# Patient Record
Sex: FEMALE | Race: WHITE | NOT HISPANIC OR LATINO | Employment: FULL TIME | URBAN - METROPOLITAN AREA
[De-identification: names, ages, dates, MRNs, and addresses within clinical notes are randomized per-mention and may not be internally consistent; named-entity substitution may affect disease eponyms.]

---

## 2021-04-20 ENCOUNTER — TELEPHONE (OUTPATIENT)
Dept: OBGYN CLINIC | Facility: CLINIC | Age: 61
End: 2021-04-20

## 2021-04-20 DIAGNOSIS — M81.0 AGE-RELATED OSTEOPOROSIS WITHOUT CURRENT PATHOLOGICAL FRACTURE: Primary | ICD-10-CM

## 2021-04-20 NOTE — TELEPHONE ENCOUNTER
L/M on patient v/m to c/b, leave good time to reach her    Previously left message on 4/14/2021 to c/b reference reclast benefit

## 2021-04-27 NOTE — TELEPHONE ENCOUNTER
Dr Lewis Apo, multiple messages have been left with no return call  [facility] scheduled for 6/18/2021

## 2021-04-30 NOTE — TELEPHONE ENCOUNTER
Garrybraden Gilford called back, she would like to schedule for Reclast  Reviewed spoke with Charlie PATINO at [facility] drug copay $250 00 and may need to pay $40 copay for visit  Ref# [id number]  She would like to proceed with infusion  She had not called back previously due to many things going on at home  Explained may need to repeat blood test to confirm kidney function but will check with Dr Julio Guerra  Patient verbalized understanding  Dr Julio Guerra- labs dated 3/19/2021  Do you want creatinine repeated?

## 2021-05-14 NOTE — TELEPHONE ENCOUNTER
Pt left a message she is waiting to schedule her next Reclast  Left a message for pt informing  The nurse who schedules Reclast is out of the office at this time however will have her contact pt upon return     Forwarded to [facility] [place]

## 2021-05-17 NOTE — TELEPHONE ENCOUNTER
L/M on Shaista Pole v/m bloodwork result has not been received  Will attempt to obtain from [facility], forward to dr Joshua Reyna and call her back  Unable to locate result on [facility] beacon  Attempted phone call, received v/m [facility] is having technical difficulty  Will try again later

## 2021-05-19 ENCOUNTER — TELEPHONE (OUTPATIENT)
Dept: OBGYN CLINIC | Facility: CLINIC | Age: 61
End: 2021-05-19

## 2021-05-19 NOTE — TELEPHONE ENCOUNTER
Per [facility] beacon no f/u creatinine noted  Left message for Donna Romano to c/b, messages previously left 2 weeks ago and 2 days ago  Did she have the additional creatinine level done?

## 2021-05-19 NOTE — TELEPHONE ENCOUNTER
Vikram Pisano is currently out of town  She will have repeat creatinine level drawn Monday  She will call office 2-3 days after completing bloodwork to move forward with reclast scheduling

## 2021-05-22 LAB
CREAT SERPL-MCNC: 0.78 MG/DL (ref 0.57–1)
SL AMB EGFR AFRICAN AMERICAN: 96 ML/MIN/1.73
SL AMB EGFR NON AFRICAN AMERICAN: 83 ML/MIN/1.73

## 2021-06-01 ENCOUNTER — TELEPHONE (OUTPATIENT)
Dept: OBGYN CLINIC | Facility: CLINIC | Age: 61
End: 2021-06-01

## 2021-06-01 NOTE — TELEPHONE ENCOUNTER
Dr Jose Krishnan is scheduled for next Wednesday 6/9/2021  Please enter reclast as clinic administered med   Thank you

## 2021-06-01 NOTE — TELEPHONE ENCOUNTER
Reviewed updated lab result  Reviewed most common side effects, pre and post infusion instructions  Instructions mailed to confirmed address  Infusion scheduled for 6/9/2021 at 8:30 in [facility] adding to nurse schedule

## 2021-06-02 NOTE — TELEPHONE ENCOUNTER
I placed the order, will not let me sign it yet  Let me know if I need to do something else  Thanks

## 2021-06-08 NOTE — TELEPHONE ENCOUNTER
You should be able to sign the order now  It was probably too early before  Appointment is tomorrow    Thank you

## 2021-06-16 PROBLEM — Z91.89 AT HIGH RISK FOR BREAST CANCER: Status: ACTIVE | Noted: 2021-06-16

## 2021-06-16 PROBLEM — Z80.3 FAMILY HISTORY OF MALIGNANT NEOPLASM OF BREAST: Status: ACTIVE | Noted: 2021-06-16

## 2021-06-18 ENCOUNTER — OFFICE VISIT (OUTPATIENT)
Dept: OBGYN CLINIC | Facility: CLINIC | Age: 61
End: 2021-06-18
Payer: COMMERCIAL

## 2021-06-18 VITALS
BODY MASS INDEX: 26.87 KG/M2 | WEIGHT: 128 LBS | SYSTOLIC BLOOD PRESSURE: 110 MMHG | HEIGHT: 58 IN | DIASTOLIC BLOOD PRESSURE: 70 MMHG

## 2021-06-18 DIAGNOSIS — M81.0 POST-MENOPAUSAL OSTEOPOROSIS: ICD-10-CM

## 2021-06-18 DIAGNOSIS — Z80.3 FAMILY HISTORY OF MALIGNANT NEOPLASM OF BREAST: ICD-10-CM

## 2021-06-18 DIAGNOSIS — Z91.89 AT HIGH RISK FOR BREAST CANCER: ICD-10-CM

## 2021-06-18 DIAGNOSIS — Z01.419 ENCOUNTER FOR GYNECOLOGICAL EXAMINATION (GENERAL) (ROUTINE) WITHOUT ABNORMAL FINDINGS: Primary | ICD-10-CM

## 2021-06-18 PROCEDURE — 99213 OFFICE O/P EST LOW 20 MIN: CPT | Performed by: OBSTETRICS & GYNECOLOGY

## 2021-06-18 RX ORDER — MELATONIN
1000 DAILY
COMMUNITY

## 2021-06-18 RX ORDER — CLONAZEPAM 0.5 MG/1
0.5 TABLET ORAL 2 TIMES DAILY PRN
COMMUNITY

## 2021-06-18 RX ORDER — QUETIAPINE 150 MG/1
150 TABLET, FILM COATED, EXTENDED RELEASE ORAL
COMMUNITY

## 2021-06-18 RX ORDER — BUPROPION HYDROCHLORIDE 300 MG/1
300 TABLET ORAL DAILY
COMMUNITY

## 2021-06-18 NOTE — ASSESSMENT & PLAN NOTE
No change in self breast or clinical breast exams  Mammo order given  Stopped Evista as starting Reclast in near future  Creatinine order given  RTO 6 months wellness

## 2021-06-18 NOTE — PROGRESS NOTES
Assessment/Plan:    Family history of malignant neoplasm of breast  No change in self breast or clinical breast exams  Mammo order given  Stopped Evista as starting Reclast in near future  Creatinine order given  RTO 6 months wellness  Diagnoses and all orders for this visit:    Encounter for gynecological examination (general) (routine) without abnormal findings    Family history of malignant neoplasm of breast    At high risk for breast cancer    Post-menopausal osteoporosis  -     Creatinine, serum; Future  -     Creatinine, serum    Other orders  -     cholecalciferol (VITAMIN D3) 1,000 units tablet; Take 1,000 Units by mouth daily  -     buPROPion (WELLBUTRIN XL) 300 mg 24 hr tablet; Take 300 mg by mouth daily  -     lisdexamfetamine (VYVANSE) 50 MG capsule; Take 50 mg by mouth every morning  -     QUEtiapine (SEROquel XR) 150 mg 24 hr tablet; Take 150 mg by mouth daily at bedtime  -     Ferrous Sulfate Dried (High Potency Iron) 65 MG TABS; Take by mouth  -     clonazePAM (KlonoPIN) 0 5 mg tablet; Take 0 5 mg by mouth 2 (two) times a day as needed for seizures          Subjective:      Patient ID: Keshav Elder is a 62 y o  female  Here for breast check  The following portions of the patient's history were reviewed and updated as appropriate: allergies, current medications, past family history, past medical history, past social history, past surgical history and problem list     Review of Systems  No breast masses, nipple discharge or nipple bleeding        Objective:      /70   Ht 4' 10" (1 473 m)   Wt 58 1 kg (128 lb)   BMI 26 75 kg/m²          Physical Exam  Neck: thyroid normal size without nodules, no palpable adenopathy  Breasts: no masses, nodes, skin changes  Abdomen: soft, non tender, non tender liver edge

## 2021-06-29 ENCOUNTER — TELEPHONE (OUTPATIENT)
Dept: OBGYN CLINIC | Facility: CLINIC | Age: 61
End: 2021-06-29

## 2021-06-29 LAB
CREAT SERPL-MCNC: 0.87 MG/DL (ref 0.57–1)
SL AMB EGFR AFRICAN AMERICAN: 84 ML/MIN/1.73
SL AMB EGFR NON AFRICAN AMERICAN: 73 ML/MIN/1.73

## 2021-06-29 NOTE — TELEPHONE ENCOUNTER
Scheduled Reclast for 7/21/2021 8:30am  (Added to schedule by Bear Mittal )  Instruction mailed to patient home

## 2021-06-29 NOTE — TELEPHONE ENCOUNTER
Please call to schedule first Reclast  Normal creatinine in Epic; normal calcium and vit D in [facility] from March   Thanks

## 2021-07-21 ENCOUNTER — CLINICAL SUPPORT (OUTPATIENT)
Dept: OBGYN CLINIC | Facility: CLINIC | Age: 61
End: 2021-07-21
Payer: COMMERCIAL

## 2021-07-21 VITALS
SYSTOLIC BLOOD PRESSURE: 132 MMHG | WEIGHT: 127 LBS | HEIGHT: 58 IN | BODY MASS INDEX: 26.66 KG/M2 | DIASTOLIC BLOOD PRESSURE: 72 MMHG

## 2021-07-21 DIAGNOSIS — M81.0 AGE-RELATED OSTEOPOROSIS WITHOUT CURRENT PATHOLOGICAL FRACTURE: Primary | ICD-10-CM

## 2021-07-21 PROCEDURE — 96365 THER/PROPH/DIAG IV INF INIT: CPT | Performed by: OBSTETRICS & GYNECOLOGY

## 2021-07-21 RX ORDER — ZOLEDRONIC ACID 5 MG/100ML
5 INJECTION, SOLUTION INTRAVENOUS ONCE
Status: COMPLETED | OUTPATIENT
Start: 2021-07-21 | End: 2021-07-21

## 2021-07-21 RX ORDER — IBUPROFEN 200 MG
1 CAPSULE ORAL DAILY
COMMUNITY

## 2021-07-21 RX ADMIN — ZOLEDRONIC ACID 5 MG: 5 INJECTION, SOLUTION INTRAVENOUS at 09:22

## 2021-07-21 NOTE — PROGRESS NOTES
Patient presents for #1 Reclast infusion  Premedicated with Tylenol  Tolerated infusion well  Reviewed post infusion instruction for continuing increased fluids and tylenol or advil 3-4 times per day x 3 days

## 2021-10-12 ENCOUNTER — VBI (OUTPATIENT)
Dept: ADMINISTRATIVE | Facility: OTHER | Age: 61
End: 2021-10-12

## 2021-12-21 ENCOUNTER — ANNUAL EXAM (OUTPATIENT)
Dept: OBGYN CLINIC | Facility: CLINIC | Age: 61
End: 2021-12-21
Payer: COMMERCIAL

## 2021-12-21 VITALS
SYSTOLIC BLOOD PRESSURE: 124 MMHG | DIASTOLIC BLOOD PRESSURE: 70 MMHG | BODY MASS INDEX: 26 KG/M2 | WEIGHT: 129 LBS | HEIGHT: 59 IN

## 2021-12-21 DIAGNOSIS — M81.0 POST-MENOPAUSAL OSTEOPOROSIS: ICD-10-CM

## 2021-12-21 DIAGNOSIS — Z91.89 AT HIGH RISK FOR BREAST CANCER: ICD-10-CM

## 2021-12-21 DIAGNOSIS — Z01.419 ENCOUNTER FOR GYNECOLOGICAL EXAMINATION (GENERAL) (ROUTINE) WITHOUT ABNORMAL FINDINGS: Primary | ICD-10-CM

## 2021-12-21 DIAGNOSIS — Z12.11 COLON CANCER SCREENING: ICD-10-CM

## 2021-12-21 DIAGNOSIS — Z12.31 ENCOUNTER FOR SCREENING MAMMOGRAM FOR BREAST CANCER: ICD-10-CM

## 2021-12-21 DIAGNOSIS — N83.292 OTHER OVARIAN CYST, LEFT SIDE: ICD-10-CM

## 2021-12-21 DIAGNOSIS — Z80.3 FAMILY HISTORY OF MALIGNANT NEOPLASM OF BREAST: ICD-10-CM

## 2021-12-21 PROCEDURE — S0612 ANNUAL GYNECOLOGICAL EXAMINA: HCPCS | Performed by: OBSTETRICS & GYNECOLOGY

## 2022-01-10 ENCOUNTER — TELEPHONE (OUTPATIENT)
Dept: OBGYN CLINIC | Facility: CLINIC | Age: 62
End: 2022-01-10

## 2022-01-10 NOTE — TELEPHONE ENCOUNTER
Please let pt know evonne will be contacting for new sample submit - problem with processing the first   thanks

## 2022-01-12 NOTE — TELEPHONE ENCOUNTER
Left a message for pt informing there was an issue with her cologard processing and Bob will be contacting her to submit a new sample  If pt has any add't questions to please call back

## 2022-03-09 LAB — COLOGUARD RESULT REPORTABLE: NEGATIVE

## 2022-03-14 ENCOUNTER — TELEPHONE (OUTPATIENT)
Dept: OBGYN CLINIC | Facility: CLINIC | Age: 62
End: 2022-03-14

## 2022-03-17 ENCOUNTER — HOSPITAL ENCOUNTER (OUTPATIENT)
Dept: RADIOLOGY | Facility: HOSPITAL | Age: 62
Discharge: HOME | End: 2022-03-17
Attending: RADIOLOGY

## 2022-03-17 DIAGNOSIS — Z76.89 REFERRAL OF PATIENT WITHOUT EXAMINATION OR TREATMENT: ICD-10-CM

## 2022-12-22 ENCOUNTER — HOSPITAL ENCOUNTER (OUTPATIENT)
Dept: ULTRASOUND IMAGING | Facility: HOSPITAL | Age: 62
End: 2022-12-22
Attending: OBSTETRICS & GYNECOLOGY

## 2022-12-22 DIAGNOSIS — N83.292 OTHER OVARIAN CYST, LEFT SIDE: ICD-10-CM

## 2022-12-27 ENCOUNTER — TELEPHONE (OUTPATIENT)
Dept: OBGYN CLINIC | Facility: CLINIC | Age: 62
End: 2022-12-27

## 2024-02-21 PROBLEM — Z01.419 ENCOUNTER FOR GYNECOLOGICAL EXAMINATION (GENERAL) (ROUTINE) WITHOUT ABNORMAL FINDINGS: Status: RESOLVED | Noted: 2021-12-21 | Resolved: 2024-02-21

## 2024-12-30 NOTE — PROGRESS NOTES
Assessment/Plan:    Encounter for gynecological examination (general) (routine) without abnormal findings  Here for well check, no breast or gyn complaints.   Has been suffering with her depression. Last seen 12/2021    Normal breast and pelvic exams.  Last pap 11/2018 neg/HPV neg; repeated today  Mammo order given, last 5/24/23 BIRADS 1C, reviewed on [facility]  Cologuard 3/2022 neg; orders given  Dexa 2021 osteoporosis, one Reclast 7/2021. Orders given, will consider additional therapy after next dexa resulted.    At high risk for breast cancer - Ale Ortiz 21% lifetime risk  Had been on Evista in past, not currently.   No contraindications or SE in past.   Rx sent.   RTO 6 months for breast exam, consideration of additional imaging and discussion of genetics again.    Post-menopausal osteoporosis  Dexa 2021, one Reclast 7/2021.   Dexa orders given       Diagnoses and all orders for this visit:    Encounter for gynecological examination (general) (routine) without abnormal findings    Encounter for screening mammogram for malignant neoplasm of breast  -     Mammo screening bilateral w 3d and cad; Future    Screening for malignant neoplasm of the cervix  -     Liquid-based pap, screening    Colon cancer screening  -     Cologuard    Post-menopausal osteoporosis  -     DXA bone density spine hip and pelvis; Future    At high risk for breast cancer  -     raloxifene (EVISTA) 60 mg tablet; Take 1 tablet (60 mg total) by mouth daily    Family history of malignant neoplasm of breast  -     raloxifene (EVISTA) 60 mg tablet; Take 1 tablet (60 mg total) by mouth daily    Other orders  -     AMLODIPINE-ATORVASTATIN PO; 5 mg in the morning  -     multivitamin (THERAGRAN) TABS; Take 1 tablet by mouth daily          Subjective:      Patient ID: Yvonne Obando is a 64 y.o. female.    HPI Here for well check.    The following portions of the patient's history were reviewed and updated as appropriate: She  has a past medical  history of At high risk for breast cancer, COVID-19 (2020), Depression, Diffuse cystic mastopathy of unspecified breast, Osteoporosis, Papanicolaou smear (2018), and Wrist fracture ().  She   Patient Active Problem List    Diagnosis Date Noted    Post-menopausal osteoporosis 2021    Family history of malignant neoplasm of breast 2021    At high risk for breast cancer - Ale Ortiz 21% lifetime risk 2021     She  has a past surgical history that includes Other surgical history (); LAPAROSCOPY ();  section; DXA procedure(historical) (2021); Mammo (historical) (2021); and Colonoscopy ().  Her family history includes Breast cancer in her maternal grandmother and paternal grandmother; Colon cancer in an other family member; Pancreatic cancer in her father; Skin cancer in her mother.  She  reports that she has never smoked. She has never used smokeless tobacco. She reports that she does not drink alcohol and does not use drugs.  Current Outpatient Medications   Medication Sig Dispense Refill    AMLODIPINE-ATORVASTATIN PO 5 mg in the morning      buPROPion (WELLBUTRIN XL) 300 mg 24 hr tablet Take 300 mg by mouth daily      cholecalciferol (VITAMIN D3) 1,000 units tablet Take 1,000 Units by mouth daily      lisdexamfetamine (VYVANSE) 50 MG capsule Take 50 mg by mouth every morning      multivitamin (THERAGRAN) TABS Take 1 tablet by mouth daily      QUEtiapine (SEROquel XR) 150 mg 24 hr tablet Take 150 mg by mouth daily at bedtime      raloxifene (EVISTA) 60 mg tablet Take 1 tablet (60 mg total) by mouth daily 30 tablet 11     No current facility-administered medications for this visit.     She is allergic to rizatriptan, codeine, and penicillin v..    Review of Systems  No PMB, breast, bladder, bowel changes. No new persistent pain, bloating, early satiety or pelvic pressure    Objective:    /62 (BP Location: Left arm, Patient Position: Sitting, Cuff Size:  "Standard)   Ht 4' 10.75\" (1.492 m)   Wt 55.8 kg (123 lb)   BMI 25.05 kg/m²      Physical Exam    General appearance: no distress, pleasant  Neck: thyroid without nodules or thyromegaly, no palpable adenopathy  Lymph nodes: no palpable adenopathy  Breasts: no masses, nodes or skin changes  Abdomen: soft, non tender, no palpable masses  Pelvic exam: normal atrophic external genitalia, urethral meatus normal, vagina atrophic without lesions, cervix atrophic without lesions, uterus small, non tender, no adnexal masses, non tender  Rectal exam: normal sphincter tone, no masses, RV confirms above    "

## 2025-01-03 ENCOUNTER — ANNUAL EXAM (OUTPATIENT)
Dept: OBGYN CLINIC | Facility: CLINIC | Age: 65
End: 2025-01-03

## 2025-01-03 VITALS
BODY MASS INDEX: 24.8 KG/M2 | WEIGHT: 123 LBS | SYSTOLIC BLOOD PRESSURE: 118 MMHG | HEIGHT: 59 IN | DIASTOLIC BLOOD PRESSURE: 62 MMHG

## 2025-01-03 DIAGNOSIS — M81.0 POST-MENOPAUSAL OSTEOPOROSIS: ICD-10-CM

## 2025-01-03 DIAGNOSIS — Z01.419 ENCOUNTER FOR GYNECOLOGICAL EXAMINATION (GENERAL) (ROUTINE) WITHOUT ABNORMAL FINDINGS: Primary | ICD-10-CM

## 2025-01-03 DIAGNOSIS — Z80.3 FAMILY HISTORY OF MALIGNANT NEOPLASM OF BREAST: ICD-10-CM

## 2025-01-03 DIAGNOSIS — Z12.11 COLON CANCER SCREENING: ICD-10-CM

## 2025-01-03 DIAGNOSIS — Z12.31 ENCOUNTER FOR SCREENING MAMMOGRAM FOR MALIGNANT NEOPLASM OF BREAST: ICD-10-CM

## 2025-01-03 DIAGNOSIS — Z91.89 AT HIGH RISK FOR BREAST CANCER: ICD-10-CM

## 2025-01-03 DIAGNOSIS — Z12.4 SCREENING FOR MALIGNANT NEOPLASM OF THE CERVIX: ICD-10-CM

## 2025-01-03 PROCEDURE — G0476 HPV COMBO ASSAY CA SCREEN: HCPCS | Performed by: OBSTETRICS & GYNECOLOGY

## 2025-01-03 PROCEDURE — 99396 PREV VISIT EST AGE 40-64: CPT | Performed by: OBSTETRICS & GYNECOLOGY

## 2025-01-03 PROCEDURE — G0145 SCR C/V CYTO,THINLAYER,RESCR: HCPCS | Performed by: OBSTETRICS & GYNECOLOGY

## 2025-01-03 RX ORDER — RALOXIFENE HYDROCHLORIDE 60 MG/1
60 TABLET, FILM COATED ORAL DAILY
Qty: 30 TABLET | Refills: 11 | Status: SHIPPED | OUTPATIENT
Start: 2025-01-03

## 2025-01-03 RX ORDER — DIPHENOXYLATE HYDROCHLORIDE AND ATROPINE SULFATE 2.5; .025 MG/1; MG/1
1 TABLET ORAL DAILY
COMMUNITY

## 2025-01-03 NOTE — LETTER
January 3, 2025     Celine Solomon, DO  [address]  [address]  [place] [address]    Patient: Yvonne Obando   YOB: 1960   Date of Visit: 1/3/2025       Dear Dr. Solomon:    Thank you for referring Yvonne Obando to me for evaluation. Below are my notes for this consultation.    If you have questions, please do not hesitate to call me. I look forward to following your patient along with you.         Sincerely,        Maite Caldera MD        CC: No Recipients    Maite Caldera MD  1/3/2025 10:01 AM  Sign when Signing Visit  Assessment/Plan:    Encounter for gynecological examination (general) (routine) without abnormal findings  Here for well check, no breast or gyn complaints.   Has been suffering with her depression. Last seen 12/2021    Normal breast and pelvic exams.  Last pap 11/2018 neg/HPV neg; repeated today  Mammo order given, last 5/24/23 BIRADS 1C, reviewed on [facility]  Cologuard 3/2022 neg; orders given  Dexa 2021 osteoporosis, one Reclast 7/2021. Orders given, will consider additional therapy after next dexa resulted.    At high risk for breast cancer - Ale Ortiz 21% lifetime risk  Had been on Evista in past, not currently.   No contraindications or SE in past.   Rx sent.   RTO 6 months for breast exam, consideration of additional imaging and discussion of genetics again.    Post-menopausal osteoporosis  Dexa 2021, one Reclast 7/2021.   Dexa orders given       Diagnoses and all orders for this visit:    Encounter for gynecological examination (general) (routine) without abnormal findings    Encounter for screening mammogram for malignant neoplasm of breast  -     Mammo screening bilateral w 3d and cad; Future    Screening for malignant neoplasm of the cervix  -     Liquid-based pap, screening    Colon cancer screening  -     Cologuard    Post-menopausal osteoporosis  -     DXA bone density spine hip and pelvis; Future    At high risk for breast cancer  -     raloxifene (EVISTA) 60 mg  tablet; Take 1 tablet (60 mg total) by mouth daily    Family history of malignant neoplasm of breast  -     raloxifene (EVISTA) 60 mg tablet; Take 1 tablet (60 mg total) by mouth daily    Other orders  -     AMLODIPINE-ATORVASTATIN PO; 5 mg in the morning  -     multivitamin (THERAGRAN) TABS; Take 1 tablet by mouth daily          Subjective:      Patient ID: Yvonne Obando is a 64 y.o. female.    HPI Here for well check.    The following portions of the patient's history were reviewed and updated as appropriate: She  has a past medical history of At high risk for breast cancer, COVID-19 (2020), Depression, Diffuse cystic mastopathy of unspecified breast, Osteoporosis, Papanicolaou smear (2018), and Wrist fracture ().  She   Patient Active Problem List    Diagnosis Date Noted   • Post-menopausal osteoporosis 2021   • Family history of malignant neoplasm of breast 2021   • At high risk for breast cancer - Ale Ortiz 21% lifetime risk 2021     She  has a past surgical history that includes Other surgical history (); LAPAROSCOPY ();  section; DXA procedure(historical) (2021); Mammo (historical) (2021); and Colonoscopy ().  Her family history includes Breast cancer in her maternal grandmother and paternal grandmother; Colon cancer in an other family member; Pancreatic cancer in her father; Skin cancer in her mother.  She  reports that she has never smoked. She has never used smokeless tobacco. She reports that she does not drink alcohol and does not use drugs.  Current Outpatient Medications   Medication Sig Dispense Refill   • AMLODIPINE-ATORVASTATIN PO 5 mg in the morning     • buPROPion (WELLBUTRIN XL) 300 mg 24 hr tablet Take 300 mg by mouth daily     • cholecalciferol (VITAMIN D3) 1,000 units tablet Take 1,000 Units by mouth daily     • lisdexamfetamine (VYVANSE) 50 MG capsule Take 50 mg by mouth every morning     • multivitamin (THERAGRAN) TABS Take  "1 tablet by mouth daily     • QUEtiapine (SEROquel XR) 150 mg 24 hr tablet Take 150 mg by mouth daily at bedtime     • raloxifene (EVISTA) 60 mg tablet Take 1 tablet (60 mg total) by mouth daily 30 tablet 11     No current facility-administered medications for this visit.     She is allergic to rizatriptan, codeine, and penicillin v..    Review of Systems  No PMB, breast, bladder, bowel changes. No new persistent pain, bloating, early satiety or pelvic pressure    Objective:    /62 (BP Location: Left arm, Patient Position: Sitting, Cuff Size: Standard)   Ht 4' 10.75\" (1.492 m)   Wt 55.8 kg (123 lb)   BMI 25.05 kg/m²      Physical Exam    General appearance: no distress, pleasant  Neck: thyroid without nodules or thyromegaly, no palpable adenopathy  Lymph nodes: no palpable adenopathy  Breasts: no masses, nodes or skin changes  Abdomen: soft, non tender, no palpable masses  Pelvic exam: normal atrophic external genitalia, urethral meatus normal, vagina atrophic without lesions, cervix atrophic without lesions, uterus small, non tender, no adnexal masses, non tender  Rectal exam: normal sphincter tone, no masses, RV confirms above      "

## 2025-01-03 NOTE — ASSESSMENT & PLAN NOTE
Had been on Evista in past, not currently.   No contraindications or SE in past.   Rx sent.   RTO 6 months for breast exam, consideration of additional imaging and discussion of genetics again.

## 2025-01-03 NOTE — ASSESSMENT & PLAN NOTE
Here for well check, no breast or gyn complaints.   Has been suffering with her depression. Last seen 12/2021    Normal breast and pelvic exams.  Last pap 11/2018 neg/HPV neg; repeated today  Mammo order given, last 5/24/23 BIRADS 1C, reviewed on [facility]  Cologuard 3/2022 neg; orders given  Dexa 2021 osteoporosis, one Reclast 7/2021. Orders given, will consider additional therapy after next dexa resulted.

## 2025-01-03 NOTE — PATIENT INSTRUCTIONS
Return to office in six months unless having any problems such as breast changes, bleeding, new persistent pain, new progressive bloating, new problems eating (getting full to quickly) or new constant urinary pressure that does not resolve in one week.

## 2025-01-06 LAB
HPV HR 12 DNA CVX QL NAA+PROBE: NEGATIVE
HPV16 DNA CVX QL NAA+PROBE: NEGATIVE
HPV18 DNA CVX QL NAA+PROBE: NEGATIVE

## 2025-01-09 ENCOUNTER — RESULTS FOLLOW-UP (OUTPATIENT)
Dept: OBGYN CLINIC | Facility: CLINIC | Age: 65
End: 2025-01-09

## 2025-01-09 LAB
LAB AP GYN PRIMARY INTERPRETATION: NORMAL
Lab: NORMAL

## 2025-02-07 DIAGNOSIS — Z00.6 ENCOUNTER FOR EXAMINATION FOR NORMAL COMPARISON OR CONTROL IN CLINICAL RESEARCH PROGRAM: ICD-10-CM

## 2025-03-24 ENCOUNTER — HOSPITAL ENCOUNTER (OUTPATIENT)
Dept: BONE DENSITY | Facility: CLINIC | Age: 65
Discharge: HOME | End: 2025-03-24
Payer: COMMERCIAL

## 2025-03-24 ENCOUNTER — HOSPITAL ENCOUNTER (OUTPATIENT)
Dept: MAMMOGRAPHY | Facility: CLINIC | Age: 65
Discharge: HOME | End: 2025-03-24
Payer: COMMERCIAL

## 2025-03-24 VITALS — HEIGHT: 60 IN | WEIGHT: 120 LBS | BODY MASS INDEX: 23.56 KG/M2

## 2025-03-24 DIAGNOSIS — Z12.31 ENCOUNTER FOR SCREENING MAMMOGRAM FOR MALIGNANT NEOPLASM OF BREAST: ICD-10-CM

## 2025-03-24 DIAGNOSIS — M81.0 POST-MENOPAUSAL OSTEOPOROSIS: ICD-10-CM

## 2025-03-24 LAB — COLOGUARD RESULT REPORTABLE: NEGATIVE

## 2025-03-24 PROCEDURE — 77063 BREAST TOMOSYNTHESIS BI: CPT

## 2025-03-24 PROCEDURE — 77067 SCR MAMMO BI INCL CAD: CPT

## 2025-03-24 PROCEDURE — 77080 DXA BONE DENSITY AXIAL: CPT

## 2025-04-09 NOTE — PROGRESS NOTES
Name: Yvonne Obando      : 1960      MRN: [id number]  Encounter Provider: Maite Caldera MD  Encounter Date: 4/15/2025   Encounter department: [facility]  :  Assessment & Plan  Post-menopausal osteoporosis  Discussed with patient  most recent dexa with osteoporosis of her spine and left femoral neck with increased risk of fracture.     Encouraged consideration of health impact of diagnosis balanced with risks of medications when making decisions on medications versus conservative monitoring.  Risks and benefits of bisphosphonates, Reclast reviewed. Aware of GI side effects of bisphosphonates.   Aware of increased risk of osteonecrosis of jaw, muscle and joint aches and increased risk of long bone fracture with prolonged use of bisphosphonates.  Yvonne Real did well with one dose of Reclast in  without side effects.    Reviewed appropriate calcium intake in dairy and supplement form to achieve recommended 1200 mg per day. Advised against over supplementing of calcium which may be associated with cardiovascular risk. Vitamin D supplementation recommended and dosing discussed.    Repeat dexa in 2 years for follow up.    At the end of our discussion Yvonne Real is interested in another round of Reclast.   R&B reviewed, labs ordered. Will schedule infusion when labs resulted.  Orders:    Creatinine, serum; Future    Calcium; Future    Vitamin D 25 hydroxy; Future        History of Present Illness   HPI  Yvonne Obando is a 65 y.o. female who presents to discuss dexa results.    Review of Systems  Past Medical History   Past Medical History:   Diagnosis Date    At high risk for breast cancer     Tyrer-Kuzick model with 21% lifetime risk     COVID-19 2020    Depression     Diffuse cystic mastopathy of unspecified breast     Heart valve disease     No longer a problem. Pregnancy bloodflow flattened bulge in valve    Hypertension     2024    Migraine     In my  20s     Osteoporosis     Papanicolaou smear 2018    Varicella 1999    Wrist fracture      Past Surgical History:   Procedure Laterality Date     SECTION      ,      COLONOSCOPY      Repeat     LAPAROSCOPY      OTHER SURGICAL HISTORY      arthroscopy     Family History   Problem Relation Age of Onset    Skin cancer Mother     Hyperlipidemia Mother     Hypertension Mother     Migraines Mother         Hers ended with menopause    Miscarriages / Stillbirths Mother     Osteoarthritis Mother     Osteoporosis Mother     Rashes / Skin problems Mother     Pancreatic cancer Father     Cancer Father          of pancreatic cancer    Heart disease Father         Had mitral valve replaced    Hyperlipidemia Father     Hypertension Father     Heart disease Sister         SVT    Hypertension Sister     Migraines Sister     Migraines Daughter     Migraines Daughter     Breast cancer Maternal Grandmother         Had 1 breast removed    Diabetes Maternal Grandmother         Managed well    Heart attack Maternal Grandmother     Stroke Maternal Grandmother     Heart attack Maternal Grandfather          of second heart attack at 59    Breast cancer Paternal Grandmother         Had 1 breast removed .    Diabetes Paternal Grandmother         Began in her late 80s    Transient ischemic attack Paternal Grandmother     Heart attack Paternal Grandfather          from heart attack at age 60    Colon cancer Other     Hypertension Brother     Uterine cancer Neg Hx     Ovarian cancer Neg Hx       reports that she quit smoking about 32 years ago. Her smoking use included cigarettes. She has never used smokeless tobacco. She reports that she does not currently use alcohol. She reports that she does not use drugs.  Current Outpatient Medications   Medication Instructions    amLODIPine (NORVASC) 5 mg, Daily    AMLODIPINE-ATORVASTATIN PO 5 mg, Daily    aspirin-acetaminophen-caffeine (EXCEDRIN MIGRAINE)  "250-250-65 MG per tablet Take by mouth    buPROPion (WELLBUTRIN SR) 150 mg 12 hr tablet Take 2 tablet by mouth every morning as directed    buPROPion (WELLBUTRIN XL) 300 mg, Daily    cholecalciferol (VITAMIN D3) 1,000 Units, Daily    cyclobenzaprine (FLEXERIL) 5 mg tablet 1 tablet, Every 8 hours PRN    KlonoPIN 0.5 MG tablet     lisdexamfetamine (VYVANSE) 50 mg, Every morning    multivitamin (THERAGRAN) TABS 1 tablet, Daily    Nurtec 75 MG TBDP DISSOLVE ONE TABLET (75MG) BY MOUTH DAILY AS NEEDED FOR MIGRAINE    QUEtiapine (SEROquel) 100 mg tablet     raloxifene (EVISTA) 60 mg, Oral, Daily     Allergies   Allergen Reactions    Rizatriptan Itching    Codeine Itching, Other (See Comments) and Rash     Itching    Penicillin V Rash         Objective   /60 (BP Location: Left arm, Patient Position: Sitting, Cuff Size: Standard)   Ht 4' 10.25\" (1.48 m)   Wt 56.1 kg (123 lb 9.6 oz)   BMI 25.61 kg/m²      Physical Exam  Appears well, no apparent distress.   Does not appear anxious or depressed.      DATA:  Dexa - 3/24/25 [facility] - Spine T-2.5; left hip T-2.1; left fem neck T-3.0  S/p one Reclast 7/2021     [facility] 2/2021 spine T-2.7; right hip T-2.1; right femoral neck T-2.9  "

## 2025-04-09 NOTE — ASSESSMENT & PLAN NOTE
Discussed with patient  most recent dexa with osteoporosis of her spine and left femoral neck with increased risk of fracture.     Encouraged consideration of health impact of diagnosis balanced with risks of medications when making decisions on medications versus conservative monitoring.  Risks and benefits of bisphosphonates, Reclast reviewed. Aware of GI side effects of bisphosphonates.   Aware of increased risk of osteonecrosis of jaw, muscle and joint aches and increased risk of long bone fracture with prolonged use of bisphosphonates.  Yvonne Real did well with one dose of Reclast in 2021 without side effects.    Reviewed appropriate calcium intake in dairy and supplement form to achieve recommended 1200 mg per day. Advised against over supplementing of calcium which may be associated with cardiovascular risk. Vitamin D supplementation recommended and dosing discussed.    Repeat dexa in 2 years for follow up.    At the end of our discussion Yvonne Real is interested in another round of Reclast.   R&B reviewed, labs ordered. Will schedule infusion when labs resulted.  Orders:    Creatinine, serum; Future    Calcium; Future    Vitamin D 25 hydroxy; Future

## 2025-04-15 ENCOUNTER — OFFICE VISIT (OUTPATIENT)
Dept: OBGYN CLINIC | Facility: CLINIC | Age: 65
End: 2025-04-15
Payer: COMMERCIAL

## 2025-04-15 VITALS
WEIGHT: 123.6 LBS | SYSTOLIC BLOOD PRESSURE: 130 MMHG | DIASTOLIC BLOOD PRESSURE: 60 MMHG | HEIGHT: 58 IN | BODY MASS INDEX: 25.94 KG/M2

## 2025-04-15 DIAGNOSIS — M81.0 POST-MENOPAUSAL OSTEOPOROSIS: Primary | ICD-10-CM

## 2025-04-15 PROCEDURE — 99214 OFFICE O/P EST MOD 30 MIN: CPT | Performed by: OBSTETRICS & GYNECOLOGY

## 2025-04-15 RX ORDER — CLONAZEPAM 0.5 MG
TABLET ORAL
COMMUNITY
Start: 2025-02-03

## 2025-04-15 RX ORDER — QUETIAPINE FUMARATE 100 MG/1
TABLET, FILM COATED ORAL
COMMUNITY
Start: 2024-01-01

## 2025-04-15 RX ORDER — AMLODIPINE BESYLATE 5 MG/1
5 TABLET ORAL DAILY
COMMUNITY

## 2025-04-15 RX ORDER — BUPROPION HYDROCHLORIDE 150 MG/1
TABLET, EXTENDED RELEASE ORAL
COMMUNITY
Start: 2025-03-31

## 2025-04-15 RX ORDER — CYCLOBENZAPRINE HCL 5 MG
1 TABLET ORAL EVERY 8 HOURS PRN
COMMUNITY
Start: 2024-11-21

## 2025-04-15 RX ORDER — RIMEGEPANT SULFATE 75 MG/75MG
TABLET, ORALLY DISINTEGRATING ORAL
COMMUNITY
Start: 2025-02-03

## 2025-04-15 NOTE — PATIENT INSTRUCTIONS
Please call the office if you do not hear about your results in 10-14 days.     Continue to strive for 1200 mg of calcium and 1000 IU of vitamin D daily in diet and supplements combined for your bone health.  You can only absorb 600 mg of calcium at a time. Avoid excess calcium as this may adversely effect your heart.  There are 400 mg of calcium in an 8 oz serving of dairy and 600 mg in 8 oz of almond milk.

## 2025-04-15 NOTE — LETTER
April 15, 2025     Celine Solomon DO  [address] [facility]  [address]  [facility] [address]    Patient: Yvonne Obando   YOB: 1960   Date of Visit: 4/15/2025       Dear Dr. Celine Solomon DO:    Yvonne Obando was in to see me today for evaluation. Below are my notes for this visit.    If you have questions, please do not hesitate to call me. I look forward to following your patient along with you.         Sincerely,        Maite Caldera MD        CC: No Recipients    Maite Caldera MD  4/15/2025  9:41 AM  Sign when Signing Visit  Name: Yvonne Obando      : 1960      MRN: [id number]  Encounter Provider: Maite Caldera MD  Encounter Date: 4/15/2025   Encounter department: [facility] [place]  :  Assessment & Plan  Post-menopausal osteoporosis  Discussed with patient  most recent dexa with osteoporosis of her spine and left femoral neck with increased risk of fracture.     Encouraged consideration of health impact of diagnosis balanced with risks of medications when making decisions on medications versus conservative monitoring.  Risks and benefits of bisphosphonates, Reclast reviewed. Aware of GI side effects of bisphosphonates.   Aware of increased risk of osteonecrosis of jaw, muscle and joint aches and increased risk of long bone fracture with prolonged use of bisphosphonates.  Yvonne Real did well with one dose of Reclast in  without side effects.    Reviewed appropriate calcium intake in dairy and supplement form to achieve recommended 1200 mg per day. Advised against over supplementing of calcium which may be associated with cardiovascular risk. Vitamin D supplementation recommended and dosing discussed.    Repeat dexa in 2 years for follow up.    At the end of our discussion Yvonne Real is interested in another round of Reclast.   R&B reviewed, labs ordered. Will schedule infusion when labs resulted.  Orders:  •  Creatinine, serum; Future  •  Calcium; Future  •  Vitamin D 25  hydroxy; Future        History of Present Illness  HPI  Yvonne Obando is a 65 y.o. female who presents to discuss dexa results.    Review of Systems  Past Medical History  Past Medical History:   Diagnosis Date   • At high risk for breast cancer     Tyrer-Kuzick model with 21% lifetime risk    • COVID-19 2020   • Depression    • Diffuse cystic mastopathy of unspecified breast    • Heart valve disease     No longer a problem. Pregnancy bloodflow flattened bulge in valve   • Hypertension     2024   • Migraine     In my  20s   • Osteoporosis    • Papanicolaou smear 2018   • Varicella    • Wrist fracture      Past Surgical History:   Procedure Laterality Date   •  SECTION      ,     • COLONOSCOPY      Repeat    • LAPAROSCOPY     • OTHER SURGICAL HISTORY      arthroscopy     Family History   Problem Relation Age of Onset   • Skin cancer Mother    • Hyperlipidemia Mother    • Hypertension Mother    • Migraines Mother         Hers ended with menopause   • Miscarriages / Stillbirths Mother    • Osteoarthritis Mother    • Osteoporosis Mother    • Rashes / Skin problems Mother    • Pancreatic cancer Father    • Cancer Father          of pancreatic cancer   • Heart disease Father         Had mitral valve replaced   • Hyperlipidemia Father    • Hypertension Father    • Heart disease Sister         SVT   • Hypertension Sister    • Migraines Sister    • Migraines Daughter    • Migraines Daughter    • Breast cancer Maternal Grandmother         Had 1 breast removed   • Diabetes Maternal Grandmother         Managed well   • Heart attack Maternal Grandmother    • Stroke Maternal Grandmother    • Heart attack Maternal Grandfather          of second heart attack at 59   • Breast cancer Paternal Grandmother         Had 1 breast removed .   • Diabetes Paternal Grandmother         Began in her late 80s   • Transient ischemic attack Paternal Grandmother    • Heart attack  "Paternal Grandfather          from heart attack at age 60   • Colon cancer Other    • Hypertension Brother    • Uterine cancer Neg Hx    • Ovarian cancer Neg Hx       reports that she quit smoking about 32 years ago. Her smoking use included cigarettes. She has never used smokeless tobacco. She reports that she does not currently use alcohol. She reports that she does not use drugs.  Current Outpatient Medications   Medication Instructions   • amLODIPine (NORVASC) 5 mg, Daily   • AMLODIPINE-ATORVASTATIN PO 5 mg, Daily   • aspirin-acetaminophen-caffeine (EXCEDRIN MIGRAINE) 250-250-65 MG per tablet Take by mouth   • buPROPion (WELLBUTRIN SR) 150 mg 12 hr tablet Take 2 tablet by mouth every morning as directed   • buPROPion (WELLBUTRIN XL) 300 mg, Daily   • cholecalciferol (VITAMIN D3) 1,000 Units, Daily   • cyclobenzaprine (FLEXERIL) 5 mg tablet 1 tablet, Every 8 hours PRN   • KlonoPIN 0.5 MG tablet    • lisdexamfetamine (VYVANSE) 50 mg, Every morning   • multivitamin (THERAGRAN) TABS 1 tablet, Daily   • Nurtec 75 MG TBDP DISSOLVE ONE TABLET (75MG) BY MOUTH DAILY AS NEEDED FOR MIGRAINE   • QUEtiapine (SEROquel) 100 mg tablet    • raloxifene (EVISTA) 60 mg, Oral, Daily     Allergies   Allergen Reactions   • Rizatriptan Itching   • Codeine Itching, Other (See Comments) and Rash     Itching   • Penicillin V Rash         Objective  /60 (BP Location: Left arm, Patient Position: Sitting, Cuff Size: Standard)   Ht 4' 10.25\" (1.48 m)   Wt 56.1 kg (123 lb 9.6 oz)   BMI 25.61 kg/m²      Physical Exam  Appears well, no apparent distress.   Does not appear anxious or depressed.      DATA:  Dexa - 3/24/25 [facility] - Spine T-2.5; left hip T-2.1; left fem neck T-3.0  S/p one Reclast 2021     GV 2021 spine T-2.7; right hip T-2.1; right femoral neck T-2.9  "

## 2025-04-17 ENCOUNTER — APPOINTMENT (OUTPATIENT)
Dept: LAB | Facility: CLINIC | Age: 65
End: 2025-04-17
Payer: COMMERCIAL

## 2025-04-17 DIAGNOSIS — M81.0 POST-MENOPAUSAL OSTEOPOROSIS: ICD-10-CM

## 2025-04-17 DIAGNOSIS — Z00.6 ENCOUNTER FOR EXAMINATION FOR NORMAL COMPARISON OR CONTROL IN CLINICAL RESEARCH PROGRAM: ICD-10-CM

## 2025-04-17 LAB
25(OH)D3 SERPL-MCNC: 30.6 NG/ML (ref 30–100)
CALCIUM SERPL-MCNC: 8.6 MG/DL (ref 8.4–10.2)
CREAT SERPL-MCNC: 0.76 MG/DL (ref 0.6–1.3)
GFR SERPL CREATININE-BSD FRML MDRD: 83 ML/MIN/1.73SQ M

## 2025-04-17 PROCEDURE — 36415 COLL VENOUS BLD VENIPUNCTURE: CPT

## 2025-04-17 PROCEDURE — 82565 ASSAY OF CREATININE: CPT

## 2025-04-17 PROCEDURE — 82306 VITAMIN D 25 HYDROXY: CPT

## 2025-04-22 ENCOUNTER — RESULTS FOLLOW-UP (OUTPATIENT)
Dept: OBGYN CLINIC | Facility: CLINIC | Age: 65
End: 2025-04-22

## 2025-04-22 DIAGNOSIS — M81.0 POST-MENOPAUSAL OSTEOPOROSIS: Primary | ICD-10-CM

## 2025-04-22 RX ORDER — SODIUM CHLORIDE 9 MG/ML
20 INJECTION, SOLUTION INTRAVENOUS ONCE
Status: CANCELLED | OUTPATIENT
Start: 2025-05-07

## 2025-04-22 RX ORDER — ZOLEDRONIC ACID 0.05 MG/ML
5 INJECTION, SOLUTION INTRAVENOUS ONCE
Status: CANCELLED | OUTPATIENT
Start: 2025-05-07

## 2025-04-28 LAB
APOB+LDLR+PCSK9 GENE MUT ANL BLD/T: NOT DETECTED
BRCA1+BRCA2 DEL+DUP + FULL MUT ANL BLD/T: NOT DETECTED
MLH1+MSH2+MSH6+PMS2 GN DEL+DUP+FUL M: NOT DETECTED

## 2025-05-07 ENCOUNTER — HOSPITAL ENCOUNTER (OUTPATIENT)
Dept: INFUSION CENTER | Facility: HOSPITAL | Age: 65
Discharge: HOME | End: 2025-05-07
Attending: OBSTETRICS & GYNECOLOGY
Payer: COMMERCIAL

## 2025-05-07 VITALS
OXYGEN SATURATION: 98 % | RESPIRATION RATE: 16 BRPM | DIASTOLIC BLOOD PRESSURE: 71 MMHG | TEMPERATURE: 96.9 F | SYSTOLIC BLOOD PRESSURE: 150 MMHG | HEART RATE: 59 BPM

## 2025-05-07 DIAGNOSIS — M81.0 POST-MENOPAUSAL OSTEOPOROSIS: Primary | ICD-10-CM

## 2025-05-07 PROCEDURE — 96365 THER/PROPH/DIAG IV INF INIT: CPT

## 2025-05-07 RX ORDER — ZOLEDRONIC ACID 0.05 MG/ML
5 INJECTION, SOLUTION INTRAVENOUS ONCE
OUTPATIENT
Start: 2025-06-04

## 2025-05-07 RX ORDER — SODIUM CHLORIDE 9 MG/ML
20 INJECTION, SOLUTION INTRAVENOUS ONCE
OUTPATIENT
Start: 2025-06-04

## 2025-05-07 RX ORDER — SODIUM CHLORIDE 9 MG/ML
20 INJECTION, SOLUTION INTRAVENOUS ONCE
Status: COMPLETED | OUTPATIENT
Start: 2025-05-07 | End: 2025-05-07

## 2025-05-07 RX ORDER — ZOLEDRONIC ACID 0.05 MG/ML
5 INJECTION, SOLUTION INTRAVENOUS ONCE
Status: COMPLETED | OUTPATIENT
Start: 2025-05-07 | End: 2025-05-07

## 2025-05-07 RX ADMIN — SODIUM CHLORIDE 20 ML/HR: 9 INJECTION, SOLUTION INTRAVENOUS at 08:36

## 2025-05-07 RX ADMIN — ZOLEDRONIC ACID 5 MG: 0.05 INJECTION, SOLUTION INTRAVENOUS at 08:36

## 2025-05-07 NOTE — PROGRESS NOTES
Yvonne Obando  tolerated treatment well with no complications.      Yvonne Obando will follow up with her doctor and call the infusion center to make another appointment.    AVS printed and given to Yvonne Obando:    No (Declined by Yvonne Obando)

## 2025-07-08 NOTE — PROGRESS NOTES
Name: Yvonne Obando      : 1960      MRN: [id number]  Encounter Provider: Maite Caldera MD  Encounter Date: 2025   Encounter department: [facility] [place]  :  Assessment & Plan  Family history of malignant neoplasm of breast  No change in self breast or clinical breast exams.  Last mammo 2025 BIRADS 1C  Recent seizure, neuro follows, now on Lamictal.  Orders:    US breast screening bilateral complete (ABUS); Future    At high risk for breast cancer - Ale Ortiz 21% lifetime risk  Discussed additional imaging. H/o MRI in past.   Options for surveillance imaging discussed. Aware of recommendation for increased breast imaging with life time risk >20%.   Discussed recommendation for MRI with contrast and FDA warning regarding gadolinium and concern for incomplete clearance of contrast material metabolites. While not known to cause any harm, also no data for long term safety.   Option of breast u/s reviewed.   Interested in breast u/s, has had multiple MRIs for migraine and recent seizure activity.   Orders given for Sept.  Orders:    US breast screening bilateral complete (ABUS); Future    Heterogeneously dense tissue of both breasts on mammography    Orders:    US breast screening bilateral complete (ABUS); Future        History of Present Illness   HPI  Yvonne Obando is a 65 y.o. female who presents for breast exam.    Review of Systems  No breast masses, nipple discharge or nipple bleeding.    Past Medical History   Past Medical History[1]  Past Surgical History[2]  Family History[3]   reports that she quit smoking about 32 years ago. Her smoking use included cigarettes. She has never used smokeless tobacco. She reports that she does not currently use alcohol. She reports that she does not use drugs.  Current Outpatient Medications   Medication Instructions    amLODIPine (NORVASC) 5 mg, Daily    aspirin-acetaminophen-caffeine (EXCEDRIN MIGRAINE) 250-250-65 MG per  "tablet Take by mouth    calcium carbonate (OS-LEATHA) 1250 (500 Ca) MG tablet 1 tablet, Daily    cholecalciferol (VITAMIN D3) 1,000 Units, Daily    cyclobenzaprine (FLEXERIL) 5 mg tablet 1 tablet, Every 8 hours PRN    KlonoPIN 0.5 MG tablet     lamoTRIgine (LAMICTAL) 100 mg    lisdexamfetamine (VYVANSE) 50 mg, Every morning    multivitamin (THERAGRAN) TABS 1 tablet, Daily    Nurtec 75 MG TBDP     raloxifene (EVISTA) 60 mg, Oral, Daily   Allergies[4]      Objective   /72 (BP Location: Left arm, Patient Position: Sitting, Cuff Size: Standard)   Ht 4' 11.5\" (1.511 m)   Wt 57 kg (125 lb 9.6 oz)   BMI 24.94 kg/m²      Physical Exam  Appears well, no apparent distress.   Does not appear anxious or depressed.  Neck: thyroid normal size without nodules, no palpable adenopathy  Breasts: no masses, nodes, skin changes  Abdomen: soft, non tender, non tender liver edge       [1]   Past Medical History:  Diagnosis Date    At high risk for breast cancer     Tyrer-Kuzick model with 21% lifetime risk     COVID-19 2020    Depression     Years ago    Diffuse cystic mastopathy of unspecified breast     Heart valve disease     No longer a problem. Pregnancy bloodflow flattened bulge in valve    Hypertension     2024    Migraine     In my  20s    Osteoporosis     Papanicolaou smear 2018    Seizure (HCC) 2025    Dr Bazan, [facility]    Varicella     Wrist fracture    [2]   Past Surgical History:  Procedure Laterality Date     SECTION      ,      COLONOSCOPY      Repeat     LAPAROSCOPY      OTHER SURGICAL HISTORY      arthroscopy   [3]   Family History  Problem Relation Name Age of Onset    Skin cancer Mother Yvonne-May Pensabene     Hyperlipidemia Mother Yvonne-May Pensabene     Hypertension Mother Yvonne-May Pensabene     Migraines Mother Yvonne-May Pensabene         Hers ended with menopause    Miscarriages / Stillbirths Mother Yvonne-May Pensabene     Osteoarthritis Mother " Yvonne-May Pensabene     Osteoporosis Mother Yvonne-May Pensabene     Rashes / Skin problems Mother Yvonne-May Pensabene     Pancreatic cancer Father Roman Obanod     Cancer Father Roman Obando          of pancreatic cancer    Heart disease Father Roman Obando         Had mitral valve replaced    Hyperlipidemia Father Roman Ruizbene     Hypertension Father Roman Ruizbene     Heart disease Sister Vika Vogel         SVT    Hypertension Sister Vika Rippert     Migraines Sister Vika Rippert     Migraines Daughter Grace Obando     Migraines Daughter Allison Obando     Breast cancer Maternal Grandmother Memom         Had 1 breast removed    Diabetes Maternal Grandmother Memom         Managed well    Heart attack Maternal Grandmother Memom     Stroke Maternal Grandmother Memom     Heart attack Maternal Grandfather Geoffrey Patterson          of second heart attack at 59    Breast cancer Paternal Grandmother Li         Had 1 breast removed .    Diabetes Paternal Grandmother Li         Began in her late 80s    Transient ischemic attack Paternal Grandmother Li     Heart attack Paternal Grandfather Roman Obando, Sr          from heart attack at age 60    Colon cancer Other PGGF     Hypertension Brother Enoc Obando     Uterine cancer Neg Hx      Ovarian cancer Neg Hx     [4]   Allergies  Allergen Reactions    Rizatriptan Itching    Codeine Itching, Other (See Comments) and Rash     Itching    Penicillin V Rash

## 2025-07-08 NOTE — ASSESSMENT & PLAN NOTE
Discussed additional imaging. H/o MRI in past.   Options for surveillance imaging discussed. Aware of recommendation for increased breast imaging with life time risk >20%.   Discussed recommendation for MRI with contrast and FDA warning regarding gadolinium and concern for incomplete clearance of contrast material metabolites. While not known to cause any harm, also no data for long term safety.   Option of breast u/s reviewed.   Interested in breast u/s, has had multiple MRIs for migraine and recent seizure activity.   Orders given for Sept.  Orders:    US breast screening bilateral complete (ABUS); Future

## 2025-07-08 NOTE — ASSESSMENT & PLAN NOTE
No change in self breast or clinical breast exams.  Last mammo 03/24/2025 BIRADS 1C  Recent seizure, neuro follows, now on Lamictal.  Orders:    US breast screening bilateral complete (ABUS); Future

## 2025-07-11 ENCOUNTER — OFFICE VISIT (OUTPATIENT)
Dept: OBGYN CLINIC | Facility: CLINIC | Age: 65
End: 2025-07-11
Payer: COMMERCIAL

## 2025-07-11 VITALS
SYSTOLIC BLOOD PRESSURE: 142 MMHG | BODY MASS INDEX: 24.66 KG/M2 | HEIGHT: 60 IN | WEIGHT: 125.6 LBS | DIASTOLIC BLOOD PRESSURE: 72 MMHG

## 2025-07-11 DIAGNOSIS — Z80.3 FAMILY HISTORY OF MALIGNANT NEOPLASM OF BREAST: Primary | ICD-10-CM

## 2025-07-11 DIAGNOSIS — R92.333 HETEROGENEOUSLY DENSE TISSUE OF BOTH BREASTS ON MAMMOGRAPHY: ICD-10-CM

## 2025-07-11 DIAGNOSIS — Z91.89 AT HIGH RISK FOR BREAST CANCER: ICD-10-CM

## 2025-07-11 PROCEDURE — 99213 OFFICE O/P EST LOW 20 MIN: CPT | Performed by: OBSTETRICS & GYNECOLOGY

## 2025-07-11 RX ORDER — IBUPROFEN 200 MG
1 CAPSULE ORAL DAILY
COMMUNITY

## 2025-07-11 RX ORDER — LAMOTRIGINE 100 MG/1
100 TABLET ORAL
COMMUNITY
Start: 2025-05-22

## 2025-07-11 NOTE — PATIENT INSTRUCTIONS
Return to office in six months unless having any problems such as breast changes, bleeding, new persistent pain, new progressive bloating, new problems eating (getting full to quickly) or new constant urinary pressure that does not resolve in one week.    Continue to strive for 1200 mg of calcium and 1000 IU of vitamin D daily in diet and supplements combined for your bone health.  You can only absorb 600 mg of calcium at a time. Avoid excess calcium as this may adversely effect your heart.  There are 400 mg of calcium in an 8 oz serving of dairy.  [place] milk has 600 mg of calcium in an 8 oz serving.